# Patient Record
Sex: FEMALE | Race: WHITE | NOT HISPANIC OR LATINO | Employment: FULL TIME | URBAN - METROPOLITAN AREA
[De-identification: names, ages, dates, MRNs, and addresses within clinical notes are randomized per-mention and may not be internally consistent; named-entity substitution may affect disease eponyms.]

---

## 2017-04-03 ENCOUNTER — HOSPITAL ENCOUNTER (EMERGENCY)
Facility: MEDICAL CENTER | Age: 57
End: 2017-04-03
Attending: EMERGENCY MEDICINE
Payer: COMMERCIAL

## 2017-04-03 VITALS
DIASTOLIC BLOOD PRESSURE: 77 MMHG | WEIGHT: 162 LBS | BODY MASS INDEX: 24.55 KG/M2 | RESPIRATION RATE: 18 BRPM | OXYGEN SATURATION: 99 % | TEMPERATURE: 98.6 F | HEART RATE: 79 BPM | SYSTOLIC BLOOD PRESSURE: 138 MMHG | HEIGHT: 68 IN

## 2017-04-03 DIAGNOSIS — S39.012A LOW BACK STRAIN, INITIAL ENCOUNTER: ICD-10-CM

## 2017-04-03 DIAGNOSIS — S16.1XXA NECK MUSCLE STRAIN, INITIAL ENCOUNTER: ICD-10-CM

## 2017-04-03 PROCEDURE — 99284 EMERGENCY DEPT VISIT MOD MDM: CPT

## 2017-04-03 ASSESSMENT — PAIN SCALES - GENERAL
PAINLEVEL_OUTOF10: 4
PAINLEVEL_OUTOF10: 4

## 2017-04-03 ASSESSMENT — LIFESTYLE VARIABLES: DO YOU DRINK ALCOHOL: NO

## 2017-04-03 NOTE — ED AVS SNAPSHOT
Home Care Instructions                                                                                                                Danika Wetzel   MRN: [id number]    Department:  [facility], Emergency Dept   Date of Visit:  4/3/2017            [facility], Emergency Dept    [address] [facility] [address]    Phone:  [phone]      You were seen by     Magda Castano M.D.      Your Diagnosis Was     Neck muscle strain, initial encounter     S16.1XXA       Follow-up Information     1. Follow up with [facility], Emergency Dept.    Specialty:  Emergency Medicine    Why:  If symptoms worsen - weakness, numbness, tingling    Contact information    [address] [facility] [address] [phone]        2. Schedule an appointment as soon as possible for a visit with [facility].    Why:  for blood pressure management    Contact information    [address] [place] [address] [phone]      Medication Information     Review all of your home medications and newly ordered medications with your primary doctor and/or pharmacist as soon as possible. Follow medication instructions as directed by your doctor and/or pharmacist.     Please keep your complete medication list with you and share with your physician. Update the information when medications are discontinued, doses are changed, or new medications (including over-the-counter products) are added; and carry medication information at all times in the event of emergency situations.               Medication List      Notice     You have not been prescribed any medications.              Discharge Instructions       Cervical Sprain  A cervical sprain is an injury in the neck in which the strong, fibrous tissues (ligaments) that connect your neck bones stretch or tear. Cervical sprains can range from mild to severe. Severe cervical sprains can cause the neck vertebrae to be unstable.  This can lead to damage of the spinal cord and can result in serious nervous system problems. The amount of time it takes for a cervical sprain to get better depends on the cause and extent of the injury. Most cervical sprains heal in 1 to 3 weeks.  CAUSES   Severe cervical sprains may be caused by:   · Contact sport injuries (such as from football, rugby, wrestling, hockey, auto racing, gymnastics, diving, martial arts, or boxing).    · Motor vehicle collisions.    · Whiplash injuries. This is an injury from a sudden forward and backward whipping movement of the head and neck.   · Falls.    Mild cervical sprains may be caused by:   · Being in an awkward position, such as while cradling a telephone between your ear and shoulder.    · Sitting in a chair that does not offer proper support.    · Working at a poorly designed computer station.    · Looking up or down for long periods of time.    SYMPTOMS   · Pain, soreness, stiffness, or a burning sensation in the front, back, or sides of the neck. This discomfort may develop immediately after the injury or slowly, 24 hours or more after the injury.    · Pain or tenderness directly in the middle of the back of the neck.    · Shoulder or upper back pain.    · Limited ability to move the neck.    · Headache.    · Dizziness.    · Weakness, numbness, or tingling in the hands or arms.    · Muscle spasms.    · Difficulty swallowing or chewing.    · Tenderness and swelling of the neck.    DIAGNOSIS   Most of the time your health care provider can diagnose a cervical sprain by taking your history and doing a physical exam. Your health care provider will ask about previous neck injuries and any known neck problems, such as arthritis in the neck. X-rays may be taken to find out if there are any other problems, such as with the bones of the neck. Other tests, such as a CT scan or MRI, may also be needed.   TREATMENT   Treatment depends on the severity of the cervical sprain. Mild  sprains can be treated with rest, keeping the neck in place (immobilization), and pain medicines. Severe cervical sprains are immediately immobilized. Further treatment is done to help with pain, muscle spasms, and other symptoms and may include:  · Medicines, such as pain relievers, numbing medicines, or muscle relaxants.    · Physical therapy. This may involve stretching exercises, strengthening exercises, and posture training. Exercises and improved posture can help stabilize the neck, strengthen muscles, and help stop symptoms from returning.    HOME CARE INSTRUCTIONS   · Put ice on the injured area.    ¨ Put ice in a plastic bag.    ¨ Place a towel between your skin and the bag.    ¨ Leave the ice on for 15-20 minutes, 3-4 times a day.    · If your injury was severe, you may have been given a cervical collar to wear. A cervical collar is a two-piece collar designed to keep your neck from moving while it heals.  ¨ Do not remove the collar unless instructed by your health care provider.  ¨ If you have long hair, keep it outside of the collar.  ¨ Ask your health care provider before making any adjustments to your collar. Minor adjustments may be required over time to improve comfort and reduce pressure on your chin or on the back of your head.  ¨ If you are allowed to remove the collar for cleaning or bathing, follow your health care provider's instructions on how to do so safely.  ¨ Keep your collar clean by wiping it with mild soap and water and drying it completely. If the collar you have been given includes removable pads, remove them every 1-2 days and hand wash them with soap and water. Allow them to air dry. They should be completely dry before you wear them in the collar.  ¨ If you are allowed to remove the collar for cleaning and bathing, wash and dry the skin of your neck. Check your skin for irritation or sores. If you see any, tell your health care provider.  ¨ Do not drive while wearing the collar.     · Only take over-the-counter or prescription medicines for pain, discomfort, or fever as directed by your health care provider.    · Keep all follow-up appointments as directed by your health care provider.    · Keep all physical therapy appointments as directed by your health care provider.    · Make any needed adjustments to your workstation to promote good posture.    · Avoid positions and activities that make your symptoms worse.    · Warm up and stretch before being active to help prevent problems.    SEEK MEDICAL CARE IF:   · Your pain is not controlled with medicine.    · You are unable to decrease your pain medicine over time as planned.    · Your activity level is not improving as expected.    SEEK IMMEDIATE MEDICAL CARE IF:   · You develop any bleeding.  · You develop stomach upset.  · You have signs of an allergic reaction to your medicine.    · Your symptoms get worse.    · You develop new, unexplained symptoms.    · You have numbness, tingling, weakness, or paralysis in any part of your body.    MAKE SURE YOU:   · Understand these instructions.  · Will watch your condition.  · Will get help right away if you are not doing well or get worse.     This information is not intended to replace advice given to you by your health care provider. Make sure you discuss any questions you have with your health care provider.     Document Released: 10/14/2008 Document Revised: 12/23/2014 Document Reviewed: 06/25/2014  Elsevier Interactive Patient Education ©2016 [employer]            Patient Information     Patient Information    Following emergency treatment: all patient requiring follow-up care must return either to a private physician or a clinic if your condition worsens before you are able to obtain further medical attention, please return to the emergency room.     Billing Information    At [facility], we work to make the billing process streamlined for our patients.  Our Representatives are here to  answer any questions you may have regarding your hospital bill.  If you have insurance coverage and have supplied your insurance information to us, we will submit a claim to your insurer on your behalf.  Should you have any questions regarding your bill, we can be reached online or by phone as follows:  Online: You are able pay your bills online or live chat with our representatives about any billing questions you may have. We are here to help Monday - Friday from 8:00am to 7:30pm and 9:00am - 12:00pm on Saturdays.  Please visit [email or web address]  for more information.   Phone:  [phone] or [phone]    Please note that your emergency physician, surgeon, pathologist, radiologist, anesthesiologist, and other specialists are not employed by [facility] and will therefore bill separately for their services.  Please contact them directly for any questions concerning their bills at the numbers below:     Emergency Physician Services:  [phone]  [facility]:  [phone]  [facility]:  [phone]  [facility]:  [phone]    1. Your final bill may vary from the amount quoted upon discharge if all procedures are not complete at that time, or if your doctor has additional procedures of which we are not aware. You will receive an additional bill if you return to the Emergency Department at [facility] for suture removal regardless of the facility of which the sutures were placed.     2. Please arrange for settlement of this account at the emergency registration.    3. All self-pay accounts are due in full at the time of treatment.  If you are unable to meet this obligation then payment is expected within 4-5 days.     4. If you have had radiology studies (CT, X-ray, Ultrasound, MRI), you have received a preliminary result during your emergency department visit. Please contact the radiology department [phone] to receive  a copy of your final result. Please discuss the Final result with your primary physician or with the follow up physician provided.     Crisis Hotline:  [place] Crisis Hotline:  7-329-BIWAYQY or 1-386.829.6514  [place] Crisis Hotline:    1-577.912.5499 or 760-479-9215         ED Discharge Follow Up Questions    1. In order to provide you with very good care, we would like to follow up with a phone call in the next few days.  May we have your permission to contact you?     YES /  NO    2. What is the best phone number to call you? (       )_____-__________    3. What is the best time to call you?      Morning  /  Afternoon  /  Evening                   Patient Signature:  ____________________________________________________________    Date:  ____________________________________________________________

## 2017-04-03 NOTE — ED AVS SNAPSHOT
[employer] Access Code: [email or web address]  Expires: 5/3/2017  8:10 PM    Your email address is not on file at [facility].  Email Addresses are required for you to sign up for [employer], please contact [phone] to verify your personal information and to provide your email address prior to attempting to register for [employer].    Danika Wetzel  No address on file    [employer]  A secure, online tool to manage your health information     [facility]’s [employer]® is a secure, online tool that connects you to your personalized health information from the privacy of your home -- day or night - making it very easy for you to manage your healthcare. Once the activation process is completed, you can even access your medical information using the [employer] kindra, which is available for free in the Apple Kindra store or Google Play store.     To learn more about [employer], visit [email or web address]    There are two levels of access available (as shown below):   My Chart Features  [facility] Primary Care Doctor [facility]  Specialists [facility]  Urgent  Care Non-[facility] Primary Care Doctor   Email your healthcare team securely and privately 24/7 X X X    Manage appointments: schedule your next appointment; view details of past/upcoming appointments X      Request prescription refills. X      View recent personal medical records, including lab and immunizations X X X X   View health record, including health history, allergies, medications X X X X   Read reports about your outpatient visits, procedures, consult and ER notes X X X X   See your discharge summary, which is a recap of your hospital and/or ER visit that includes your diagnosis, lab results, and care plan X X  X     How to register for [employer]:  Once your e-mail address has been verified, follow the following steps to sign up for [employer].     1. Go to  [email or web address]  2. Click on the Sign Up Now box, which takes you to the New Member Sign Up page. You will need to provide the  following information:  a. Enter your [employer] Access Code exactly as it appears at the top of this page. (You will not need to use this code after you’ve completed the sign-up process. If you do not sign up before the expiration date, you must request a new code.)   b. Enter your date of birth.   c. Enter your home email address.   d. Click Submit, and follow the next screen’s instructions.  3. Create a [employer] ID. This will be your [employer] login ID and cannot be changed, so think of one that is secure and easy to remember.  4. Create a [employer] password. You can change your password at any time.  5. Enter your Password Reset Question and Answer. This can be used at a later time if you forget your password.   6. Enter your e-mail address. This allows you to receive e-mail notifications when new information is available in [employer].  7. Click Sign Up. You can now view your health information.    For assistance activating your [employer] account, call [phone]

## 2017-04-03 NOTE — ED AVS SNAPSHOT
4/3/2017          Danika Wetzel  No address on file.    Dear Danika:    [facility] wants to ensure your discharge home is safe and you or your loved ones have had all your questions answered regarding your care after you leave the hospital.    You may receive a telephone call within two days of your discharge.  This call is to make certain you understand your discharge instructions as well as ensure we provided you with the best care possible during your stay with us.     The call will only last approximately 3-5 minutes and will be done by a nurse.    Once again, we want to ensure your discharge home is safe and that you have a clear understanding of any next steps in your care.  If you have any questions or concerns, please do not hesitate to contact us, we are here for you.  Thank you for choosing [facility] for your healthcare needs.    Sincerely,    Claude Correa    [facility]
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
## 2017-04-04 NOTE — DISCHARGE INSTRUCTIONS
Cervical Sprain  A cervical sprain is an injury in the neck in which the strong, fibrous tissues (ligaments) that connect your neck bones stretch or tear. Cervical sprains can range from mild to severe. Severe cervical sprains can cause the neck vertebrae to be unstable. This can lead to damage of the spinal cord and can result in serious nervous system problems. The amount of time it takes for a cervical sprain to get better depends on the cause and extent of the injury. Most cervical sprains heal in 1 to 3 weeks.  CAUSES   Severe cervical sprains may be caused by:   · Contact sport injuries (such as from football, rugby, wrestling, hockey, auto racing, gymnastics, diving, martial arts, or boxing).    · Motor vehicle collisions.    · Whiplash injuries. This is an injury from a sudden forward and backward whipping movement of the head and neck.   · Falls.    Mild cervical sprains may be caused by:   · Being in an awkward position, such as while cradling a telephone between your ear and shoulder.    · Sitting in a chair that does not offer proper support.    · Working at a poorly designed computer station.    · Looking up or down for long periods of time.    SYMPTOMS   · Pain, soreness, stiffness, or a burning sensation in the front, back, or sides of the neck. This discomfort may develop immediately after the injury or slowly, 24 hours or more after the injury.    · Pain or tenderness directly in the middle of the back of the neck.    · Shoulder or upper back pain.    · Limited ability to move the neck.    · Headache.    · Dizziness.    · Weakness, numbness, or tingling in the hands or arms.    · Muscle spasms.    · Difficulty swallowing or chewing.    · Tenderness and swelling of the neck.    DIAGNOSIS   Most of the time your health care provider can diagnose a cervical sprain by taking your history and doing a physical exam. Your health care provider will ask about previous neck injuries and any known neck  problems, such as arthritis in the neck. X-rays may be taken to find out if there are any other problems, such as with the bones of the neck. Other tests, such as a CT scan or MRI, may also be needed.   TREATMENT   Treatment depends on the severity of the cervical sprain. Mild sprains can be treated with rest, keeping the neck in place (immobilization), and pain medicines. Severe cervical sprains are immediately immobilized. Further treatment is done to help with pain, muscle spasms, and other symptoms and may include:  · Medicines, such as pain relievers, numbing medicines, or muscle relaxants.    · Physical therapy. This may involve stretching exercises, strengthening exercises, and posture training. Exercises and improved posture can help stabilize the neck, strengthen muscles, and help stop symptoms from returning.    HOME CARE INSTRUCTIONS   · Put ice on the injured area.    ¨ Put ice in a plastic bag.    ¨ Place a towel between your skin and the bag.    ¨ Leave the ice on for 15-20 minutes, 3-4 times a day.    · If your injury was severe, you may have been given a cervical collar to wear. A cervical collar is a two-piece collar designed to keep your neck from moving while it heals.  ¨ Do not remove the collar unless instructed by your health care provider.  ¨ If you have long hair, keep it outside of the collar.  ¨ Ask your health care provider before making any adjustments to your collar. Minor adjustments may be required over time to improve comfort and reduce pressure on your chin or on the back of your head.  ¨ If you are allowed to remove the collar for cleaning or bathing, follow your health care provider's instructions on how to do so safely.  ¨ Keep your collar clean by wiping it with mild soap and water and drying it completely. If the collar you have been given includes removable pads, remove them every 1-2 days and hand wash them with soap and water. Allow them to air dry. They should be completely  dry before you wear them in the collar.  ¨ If you are allowed to remove the collar for cleaning and bathing, wash and dry the skin of your neck. Check your skin for irritation or sores. If you see any, tell your health care provider.  ¨ Do not drive while wearing the collar.    · Only take over-the-counter or prescription medicines for pain, discomfort, or fever as directed by your health care provider.    · Keep all follow-up appointments as directed by your health care provider.    · Keep all physical therapy appointments as directed by your health care provider.    · Make any needed adjustments to your workstation to promote good posture.    · Avoid positions and activities that make your symptoms worse.    · Warm up and stretch before being active to help prevent problems.    SEEK MEDICAL CARE IF:   · Your pain is not controlled with medicine.    · You are unable to decrease your pain medicine over time as planned.    · Your activity level is not improving as expected.    SEEK IMMEDIATE MEDICAL CARE IF:   · You develop any bleeding.  · You develop stomach upset.  · You have signs of an allergic reaction to your medicine.    · Your symptoms get worse.    · You develop new, unexplained symptoms.    · You have numbness, tingling, weakness, or paralysis in any part of your body.    MAKE SURE YOU:   · Understand these instructions.  · Will watch your condition.  · Will get help right away if you are not doing well or get worse.     This information is not intended to replace advice given to you by your health care provider. Make sure you discuss any questions you have with your health care provider.     Document Released: 10/14/2008 Document Revised: 12/23/2014 Document Reviewed: 06/25/2014  [employer] Interactive Patient Education ©2016 [employer]
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
## 2017-05-26 ENCOUNTER — HOSPITAL ENCOUNTER (OUTPATIENT)
Dept: LAB | Facility: MEDICAL CENTER | Age: 57
End: 2017-05-26
Attending: GENERAL PRACTICE
Payer: COMMERCIAL

## 2017-05-26 LAB
25(OH)D3 SERPL-MCNC: 27 NG/ML (ref 30–100)
ALBUMIN SERPL BCP-MCNC: 4.2 G/DL (ref 3.2–4.9)
ALBUMIN/GLOB SERPL: 1.6 G/DL
ALP SERPL-CCNC: 50 U/L (ref 30–99)
ALT SERPL-CCNC: 23 U/L (ref 2–50)
ANION GAP SERPL CALC-SCNC: 7 MMOL/L (ref 0–11.9)
APPEARANCE UR: CLEAR
AST SERPL-CCNC: 19 U/L (ref 12–45)
BILIRUB SERPL-MCNC: 0.7 MG/DL (ref 0.1–1.5)
BILIRUB UR QL STRIP.AUTO: NEGATIVE
BUN SERPL-MCNC: 15 MG/DL (ref 8–22)
CALCIUM SERPL-MCNC: 9.2 MG/DL (ref 8.5–10.5)
CHLORIDE SERPL-SCNC: 105 MMOL/L (ref 96–112)
CHOLEST SERPL-MCNC: 201 MG/DL (ref 100–199)
CO2 SERPL-SCNC: 26 MMOL/L (ref 20–33)
COLOR UR: COLORLESS
CORTIS SERPL-MCNC: 8.1 UG/DL (ref 0–23)
CREAT SERPL-MCNC: 0.75 MG/DL (ref 0.5–1.4)
CRP SERPL HS-MCNC: 3 MG/L (ref 0–7.5)
EPI CELLS #/AREA URNS HPF: NORMAL /HPF
ESTRADIOL SERPL-MCNC: <20 PG/ML
FOLATE SERPL-MCNC: >24 NG/ML
GFR SERPL CREATININE-BSD FRML MDRD: >60 ML/MIN/1.73 M 2
GLOBULIN SER CALC-MCNC: 2.6 G/DL (ref 1.9–3.5)
GLUCOSE SERPL-MCNC: 77 MG/DL (ref 65–99)
GLUCOSE UR STRIP.AUTO-MCNC: NEGATIVE MG/DL
HCYS SERPL-SCNC: 8.86 UMOL/L
IRON SATN MFR SERPL: 43 % (ref 15–55)
IRON SERPL-MCNC: 137 UG/DL (ref 40–170)
KETONES UR STRIP.AUTO-MCNC: NEGATIVE MG/DL
LEUKOCYTE ESTERASE UR QL STRIP.AUTO: ABNORMAL
MICRO URNS: ABNORMAL
NITRITE UR QL STRIP.AUTO: NEGATIVE
PH UR STRIP.AUTO: 6.5 [PH]
POTASSIUM SERPL-SCNC: 4 MMOL/L (ref 3.6–5.5)
PROT SERPL-MCNC: 6.8 G/DL (ref 6–8.2)
PROT UR QL STRIP: NEGATIVE MG/DL
RBC # URNS HPF: NORMAL /HPF
RBC UR QL AUTO: NEGATIVE
SODIUM SERPL-SCNC: 138 MMOL/L (ref 135–145)
SP GR UR STRIP.AUTO: 1
T3 SERPL-MCNC: 100.9 NG/DL (ref 60–181)
T3FREE SERPL-MCNC: 3.18 PG/ML (ref 2.4–4.2)
T4 SERPL-MCNC: 6.5 UG/DL (ref 4–12)
TIBC SERPL-MCNC: 315 UG/DL (ref 250–450)
TSH SERPL DL<=0.005 MIU/L-ACNC: 0.66 UIU/ML (ref 0.3–3.7)
VIT B12 SERPL-MCNC: 588 PG/ML (ref 211–911)
WBC #/AREA URNS HPF: NORMAL /HPF

## 2017-05-26 PROCEDURE — 84144 ASSAY OF PROGESTERONE: CPT

## 2017-05-26 PROCEDURE — 80061 LIPID PANEL: CPT

## 2017-05-26 PROCEDURE — 84403 ASSAY OF TOTAL TESTOSTERONE: CPT

## 2017-05-26 PROCEDURE — 84436 ASSAY OF TOTAL THYROXINE: CPT

## 2017-05-26 PROCEDURE — 82746 ASSAY OF FOLIC ACID SERUM: CPT

## 2017-05-26 PROCEDURE — 81001 URINALYSIS AUTO W/SCOPE: CPT

## 2017-05-26 PROCEDURE — 83540 ASSAY OF IRON: CPT

## 2017-05-26 PROCEDURE — 82542 COL CHROMOTOGRAPHY QUAL/QUAN: CPT

## 2017-05-26 PROCEDURE — 83036 HEMOGLOBIN GLYCOSYLATED A1C: CPT

## 2017-05-26 PROCEDURE — 82465 ASSAY BLD/SERUM CHOLESTEROL: CPT

## 2017-05-26 PROCEDURE — 84480 ASSAY TRIIODOTHYRONINE (T3): CPT

## 2017-05-26 PROCEDURE — 86141 C-REACTIVE PROTEIN HS: CPT

## 2017-05-26 PROCEDURE — 83090 ASSAY OF HOMOCYSTEINE: CPT

## 2017-05-26 PROCEDURE — 85652 RBC SED RATE AUTOMATED: CPT

## 2017-05-26 PROCEDURE — 82533 TOTAL CORTISOL: CPT

## 2017-05-26 PROCEDURE — 82670 ASSAY OF TOTAL ESTRADIOL: CPT

## 2017-05-26 PROCEDURE — 82607 VITAMIN B-12: CPT

## 2017-05-26 PROCEDURE — 84443 ASSAY THYROID STIM HORMONE: CPT

## 2017-05-26 PROCEDURE — 83704 LIPOPROTEIN BLD QUAN PART: CPT

## 2017-05-26 PROCEDURE — 83525 ASSAY OF INSULIN: CPT

## 2017-05-26 PROCEDURE — 36415 COLL VENOUS BLD VENIPUNCTURE: CPT

## 2017-05-26 PROCEDURE — 84481 FREE ASSAY (FT-3): CPT

## 2017-05-26 PROCEDURE — 83550 IRON BINDING TEST: CPT

## 2017-05-26 PROCEDURE — 82306 VITAMIN D 25 HYDROXY: CPT

## 2017-05-26 PROCEDURE — 84270 ASSAY OF SEX HORMONE GLOBUL: CPT

## 2017-05-26 PROCEDURE — 85025 COMPLETE CBC W/AUTO DIFF WBC: CPT

## 2017-05-26 PROCEDURE — 80053 COMPREHEN METABOLIC PANEL: CPT

## 2017-05-27 LAB
BASOPHILS # BLD AUTO: 0.8 % (ref 0–1.8)
BASOPHILS # BLD: 0.03 K/UL (ref 0–0.12)
EOSINOPHIL # BLD AUTO: 0.22 K/UL (ref 0–0.51)
EOSINOPHIL NFR BLD: 6.1 % (ref 0–6.9)
ERYTHROCYTE [DISTWIDTH] IN BLOOD BY AUTOMATED COUNT: 45.4 FL (ref 35.9–50)
ERYTHROCYTE [SEDIMENTATION RATE] IN BLOOD BY WESTERGREN METHOD: 12 MM/HOUR (ref 0–30)
EST. AVERAGE GLUCOSE BLD GHB EST-MCNC: 103 MG/DL
HBA1C MFR BLD: 5.2 % (ref 0–5.6)
HCT VFR BLD AUTO: 44.3 % (ref 37–47)
HGB BLD-MCNC: 14.4 G/DL (ref 12–16)
IMM GRANULOCYTES # BLD AUTO: 0.02 K/UL (ref 0–0.11)
IMM GRANULOCYTES NFR BLD AUTO: 0.6 % (ref 0–0.9)
LYMPHOCYTES # BLD AUTO: 1.12 K/UL (ref 1–4.8)
LYMPHOCYTES NFR BLD: 30.9 % (ref 22–41)
MCH RBC QN AUTO: 31.2 PG (ref 27–33)
MCHC RBC AUTO-ENTMCNC: 32.5 G/DL (ref 33.6–35)
MCV RBC AUTO: 96.1 FL (ref 81.4–97.8)
MONOCYTES # BLD AUTO: 0.25 K/UL (ref 0–0.85)
MONOCYTES NFR BLD AUTO: 6.9 % (ref 0–13.4)
NEUTROPHILS # BLD AUTO: 1.98 K/UL (ref 2–7.15)
NEUTROPHILS NFR BLD: 54.7 % (ref 44–72)
NRBC # BLD AUTO: 0 K/UL
NRBC BLD AUTO-RTO: 0 /100 WBC
PLATELET # BLD AUTO: 171 K/UL (ref 164–446)
PMV BLD AUTO: 10 FL (ref 9–12.9)
PROGEST SERPL-MCNC: <0.08 NG/ML
RBC # BLD AUTO: 4.61 M/UL (ref 4.2–5.4)
WBC # BLD AUTO: 3.6 K/UL (ref 4.8–10.8)

## 2017-05-28 LAB — INSULIN P FAST SERPL-ACNC: 5 UIU/ML (ref 3–19)

## 2017-05-29 LAB
ANDROSTANOLONE SERPL-MCNC: 55.1 PG/ML (ref 24–208)
SHBG SERPL-SCNC: 102 NMOL/L (ref 30–135)
TESTOST FREE SERPL-MCNC: 1.9 PG/ML (ref 0.6–3.8)
TESTOST SERPL-MCNC: 24 NG/DL (ref 9–55)

## 2017-05-31 ENCOUNTER — HOSPITAL ENCOUNTER (OUTPATIENT)
Facility: MEDICAL CENTER | Age: 57
End: 2017-05-31
Attending: GENERAL PRACTICE
Payer: COMMERCIAL

## 2017-05-31 PROCEDURE — 81001 URINALYSIS AUTO W/SCOPE: CPT

## 2017-05-31 PROCEDURE — 87086 URINE CULTURE/COLONY COUNT: CPT

## 2017-06-01 LAB
APPEARANCE UR: CLEAR
BILIRUB UR QL STRIP.AUTO: NEGATIVE
CHOLEST SERPL-MCNC: 212 MG/DL (ref 100–199)
COLOR UR: YELLOW
GLUCOSE UR STRIP.AUTO-MCNC: NEGATIVE MG/DL
HDL PARTICAL NO Q4363: 45.8 UMOL/L
HDL SERPL QN: 10 NM
HDLC SERPL-MCNC: 100 MG/DL
HLD.LARGE SERPL-SCNC: 14.5 UMOL/L
KETONES UR STRIP.AUTO-MCNC: NEGATIVE MG/DL
LDL MED SERPL QN: 21.6 NM
LDL SERPL QN: 21.6 NM
LDL SERPL-SCNC: 932 NMOL/L
LDL SMALL SERPL-SCNC: <90 NMOL/L
LDL SMALL SERPL-SCNC: <90 NMOL/L
LDLC SERPL CALC-MCNC: 98 MG/DL (ref 0–99)
LEUKOCYTE ESTERASE UR QL STRIP.AUTO: ABNORMAL
LP IR SCORE Q4364: <25
MICRO URNS: ABNORMAL
NITRITE UR QL STRIP.AUTO: NEGATIVE
PH UR STRIP.AUTO: 6 [PH]
PROT UR QL STRIP: NEGATIVE MG/DL
RBC # URNS HPF: ABNORMAL /HPF
RBC UR QL AUTO: NEGATIVE
SP GR UR STRIP.AUTO: 1.02
TRIGL SERPL-MCNC: 72 MG/DL (ref 0–149)
VLDL LARGE SERPL-SCNC: <0.8 NMOL/L
VLDL SERPL QN: 45.2 NM
WBC #/AREA URNS HPF: ABNORMAL /HPF

## 2017-06-03 LAB
BACTERIA UR CULT: NORMAL
SIGNIFICANT IND 70042: NORMAL
SOURCE SOURCE: NORMAL

## 2017-08-21 ENCOUNTER — HOSPITAL ENCOUNTER (OUTPATIENT)
Facility: MEDICAL CENTER | Age: 57
End: 2017-08-21
Attending: GENERAL PRACTICE
Payer: COMMERCIAL

## 2017-08-21 PROCEDURE — 81003 URINALYSIS AUTO W/O SCOPE: CPT

## 2017-08-22 LAB
APPEARANCE UR: CLEAR
COLOR UR: YELLOW
CULTURE IF INDICATED INDCX: NO UA CULTURE
GLUCOSE UR STRIP.AUTO-MCNC: NEGATIVE MG/DL
KETONES UR STRIP.AUTO-MCNC: NEGATIVE MG/DL
LEUKOCYTE ESTERASE UR QL STRIP.AUTO: NEGATIVE
MICRO URNS: NORMAL
NITRITE UR QL STRIP.AUTO: NEGATIVE
PH UR STRIP.AUTO: 7.5 [PH]
PROT UR QL STRIP: NEGATIVE MG/DL
RBC UR QL AUTO: NEGATIVE
SP GR UR STRIP.AUTO: 1.01

## 2017-11-27 ENCOUNTER — HOSPITAL ENCOUNTER (OUTPATIENT)
Dept: RADIOLOGY | Facility: MEDICAL CENTER | Age: 57
End: 2017-11-27
Payer: COMMERCIAL

## 2017-11-27 DIAGNOSIS — K30 MILD DIETARY INDIGESTION: ICD-10-CM

## 2017-11-27 DIAGNOSIS — R10.84 ABDOMINAL PAIN, GENERALIZED: ICD-10-CM

## 2017-11-27 PROCEDURE — 74247 DX-UPPER GI-AIR CONTRAST: CPT

## 2017-11-27 PROCEDURE — 700112 HCHG RX REV CODE 229

## 2017-11-27 PROCEDURE — 76705 ECHO EXAM OF ABDOMEN: CPT

## 2017-11-27 PROCEDURE — A9270 NON-COVERED ITEM OR SERVICE: HCPCS

## 2017-11-27 RX ADMIN — ANTACID/ANTIFLATULENT 1 PACKET: 380; 550; 10; 10 GRANULE, EFFERVESCENT ORAL at 08:30

## 2019-03-14 ENCOUNTER — HOSPITAL ENCOUNTER (OUTPATIENT)
Dept: LAB | Facility: MEDICAL CENTER | Age: 59
End: 2019-03-14
Attending: NURSE PRACTITIONER
Payer: COMMERCIAL

## 2019-03-14 LAB
25(OH)D3 SERPL-MCNC: 23 NG/ML (ref 30–100)
ALBUMIN SERPL BCP-MCNC: 4.5 G/DL (ref 3.2–4.9)
ALBUMIN/GLOB SERPL: 1.6 G/DL
ALP SERPL-CCNC: 51 U/L (ref 30–99)
ALT SERPL-CCNC: 21 U/L (ref 2–50)
ANION GAP SERPL CALC-SCNC: 9 MMOL/L (ref 0–11.9)
AST SERPL-CCNC: 18 U/L (ref 12–45)
BILIRUB SERPL-MCNC: 0.6 MG/DL (ref 0.1–1.5)
BUN SERPL-MCNC: 17 MG/DL (ref 8–22)
CALCIUM SERPL-MCNC: 8.8 MG/DL (ref 8.5–10.5)
CHLORIDE SERPL-SCNC: 107 MMOL/L (ref 96–112)
CHOLEST SERPL-MCNC: 200 MG/DL (ref 100–199)
CO2 SERPL-SCNC: 26 MMOL/L (ref 20–33)
CREAT SERPL-MCNC: 0.81 MG/DL (ref 0.5–1.4)
CRP SERPL HS-MCNC: 6.8 MG/L (ref 0–7.5)
ERYTHROCYTE [DISTWIDTH] IN BLOOD BY AUTOMATED COUNT: 46 FL (ref 35.9–50)
EST. AVERAGE GLUCOSE BLD GHB EST-MCNC: 111 MG/DL
FASTING STATUS PATIENT QL REPORTED: NORMAL
GLOBULIN SER CALC-MCNC: 2.8 G/DL (ref 1.9–3.5)
GLUCOSE SERPL-MCNC: 96 MG/DL (ref 65–99)
HBA1C MFR BLD: 5.5 % (ref 0–5.6)
HCT VFR BLD AUTO: 46.1 % (ref 37–47)
HDLC SERPL-MCNC: 80 MG/DL
HGB BLD-MCNC: 15 G/DL (ref 12–16)
LDLC SERPL CALC-MCNC: 103 MG/DL
MCH RBC QN AUTO: 31.8 PG (ref 27–33)
MCHC RBC AUTO-ENTMCNC: 32.5 G/DL (ref 33.6–35)
MCV RBC AUTO: 97.7 FL (ref 81.4–97.8)
PLATELET # BLD AUTO: 213 K/UL (ref 164–446)
PMV BLD AUTO: 10.1 FL (ref 9–12.9)
POTASSIUM SERPL-SCNC: 4.5 MMOL/L (ref 3.6–5.5)
PROT SERPL-MCNC: 7.3 G/DL (ref 6–8.2)
RBC # BLD AUTO: 4.72 M/UL (ref 4.2–5.4)
SODIUM SERPL-SCNC: 142 MMOL/L (ref 135–145)
T4 FREE SERPL-MCNC: 0.81 NG/DL (ref 0.53–1.43)
TRIGL SERPL-MCNC: 84 MG/DL (ref 0–149)
TSH SERPL DL<=0.005 MIU/L-ACNC: 1 UIU/ML (ref 0.38–5.33)
VIT B12 SERPL-MCNC: 418 PG/ML (ref 211–911)
WBC # BLD AUTO: 4.4 K/UL (ref 4.8–10.8)

## 2019-03-14 PROCEDURE — 36415 COLL VENOUS BLD VENIPUNCTURE: CPT

## 2019-03-14 PROCEDURE — 80053 COMPREHEN METABOLIC PANEL: CPT

## 2019-03-14 PROCEDURE — 85027 COMPLETE CBC AUTOMATED: CPT

## 2019-03-14 PROCEDURE — 82607 VITAMIN B-12: CPT

## 2019-03-14 PROCEDURE — 82785 ASSAY OF IGE: CPT

## 2019-03-14 PROCEDURE — 82306 VITAMIN D 25 HYDROXY: CPT

## 2019-03-14 PROCEDURE — 86141 C-REACTIVE PROTEIN HS: CPT

## 2019-03-14 PROCEDURE — 83036 HEMOGLOBIN GLYCOSYLATED A1C: CPT

## 2019-03-14 PROCEDURE — 84443 ASSAY THYROID STIM HORMONE: CPT

## 2019-03-14 PROCEDURE — 80061 LIPID PANEL: CPT

## 2019-03-14 PROCEDURE — 84439 ASSAY OF FREE THYROXINE: CPT

## 2019-03-16 LAB — IGE SERPL-ACNC: 13 KU/L

## 2019-06-23 ENCOUNTER — HOSPITAL ENCOUNTER (OUTPATIENT)
Dept: RADIOLOGY | Facility: MEDICAL CENTER | Age: 59
End: 2019-06-23
Attending: NURSE PRACTITIONER
Payer: COMMERCIAL

## 2019-06-23 DIAGNOSIS — R10.12 ABDOMINAL PAIN, LEFT UPPER QUADRANT: ICD-10-CM

## 2019-06-23 DIAGNOSIS — R10.11 ABDOMINAL PAIN, RIGHT UPPER QUADRANT: ICD-10-CM

## 2019-06-23 PROCEDURE — 76700 US EXAM ABDOM COMPLETE: CPT

## 2019-06-26 ENCOUNTER — HOSPITAL ENCOUNTER (OUTPATIENT)
Dept: RADIOLOGY | Facility: MEDICAL CENTER | Age: 59
End: 2019-06-26
Attending: NURSE PRACTITIONER
Payer: COMMERCIAL

## 2019-06-26 DIAGNOSIS — R10.11 ABDOMINAL PAIN, RIGHT UPPER QUADRANT: ICD-10-CM

## 2019-06-26 DIAGNOSIS — R10.12 ABDOMINAL PAIN, LEFT UPPER QUADRANT: ICD-10-CM

## 2019-06-26 PROCEDURE — A9537 TC99M MEBROFENIN: HCPCS

## 2021-01-06 ENCOUNTER — HOSPITAL ENCOUNTER (OUTPATIENT)
Dept: LAB | Facility: MEDICAL CENTER | Age: 61
End: 2021-01-06
Attending: NURSE PRACTITIONER
Payer: COMMERCIAL

## 2021-01-06 LAB
25(OH)D3 SERPL-MCNC: 22 NG/ML (ref 30–100)
ALBUMIN SERPL BCP-MCNC: 4.5 G/DL (ref 3.2–4.9)
ALBUMIN/GLOB SERPL: 1.8 G/DL
ALP SERPL-CCNC: 61 U/L (ref 30–99)
ALT SERPL-CCNC: 21 U/L (ref 2–50)
ANION GAP SERPL CALC-SCNC: 10 MMOL/L (ref 7–16)
AST SERPL-CCNC: 18 U/L (ref 12–45)
BILIRUB SERPL-MCNC: 0.5 MG/DL (ref 0.1–1.5)
BUN SERPL-MCNC: 16 MG/DL (ref 8–22)
CALCIUM SERPL-MCNC: 9 MG/DL (ref 8.5–10.5)
CHLORIDE SERPL-SCNC: 105 MMOL/L (ref 96–112)
CHOLEST SERPL-MCNC: 199 MG/DL (ref 100–199)
CO2 SERPL-SCNC: 23 MMOL/L (ref 20–33)
CREAT SERPL-MCNC: 0.81 MG/DL (ref 0.5–1.4)
CRP SERPL HS-MCNC: 5 MG/L (ref 0–7.5)
ERYTHROCYTE [DISTWIDTH] IN BLOOD BY AUTOMATED COUNT: 45.5 FL (ref 35.9–50)
EST. AVERAGE GLUCOSE BLD GHB EST-MCNC: 108 MG/DL
FASTING STATUS PATIENT QL REPORTED: NORMAL
GLOBULIN SER CALC-MCNC: 2.5 G/DL (ref 1.9–3.5)
GLUCOSE SERPL-MCNC: 88 MG/DL (ref 65–99)
H PYLORI AG STL QL IA: NOT DETECTED
HBA1C MFR BLD: 5.4 % (ref 0–5.6)
HCT VFR BLD AUTO: 47.4 % (ref 37–47)
HDLC SERPL-MCNC: 74 MG/DL
HGB BLD-MCNC: 15.2 G/DL (ref 12–16)
LDLC SERPL CALC-MCNC: 106 MG/DL
MCH RBC QN AUTO: 31 PG (ref 27–33)
MCHC RBC AUTO-ENTMCNC: 32.1 G/DL (ref 33.6–35)
MCV RBC AUTO: 96.5 FL (ref 81.4–97.8)
PLATELET # BLD AUTO: 245 K/UL (ref 164–446)
PMV BLD AUTO: 9.9 FL (ref 9–12.9)
POTASSIUM SERPL-SCNC: 4.4 MMOL/L (ref 3.6–5.5)
PROT SERPL-MCNC: 7 G/DL (ref 6–8.2)
RBC # BLD AUTO: 4.91 M/UL (ref 4.2–5.4)
SODIUM SERPL-SCNC: 138 MMOL/L (ref 135–145)
T4 FREE SERPL-MCNC: 1.14 NG/DL (ref 0.93–1.7)
TRIGL SERPL-MCNC: 93 MG/DL (ref 0–149)
TSH SERPL DL<=0.005 MIU/L-ACNC: 0.78 UIU/ML (ref 0.38–5.33)
WBC # BLD AUTO: 4.2 K/UL (ref 4.8–10.8)

## 2021-01-06 PROCEDURE — 84439 ASSAY OF FREE THYROXINE: CPT

## 2021-01-06 PROCEDURE — 83036 HEMOGLOBIN GLYCOSYLATED A1C: CPT

## 2021-01-06 PROCEDURE — 86141 C-REACTIVE PROTEIN HS: CPT

## 2021-01-06 PROCEDURE — 85027 COMPLETE CBC AUTOMATED: CPT

## 2021-01-06 PROCEDURE — 80053 COMPREHEN METABOLIC PANEL: CPT

## 2021-01-06 PROCEDURE — 82306 VITAMIN D 25 HYDROXY: CPT

## 2021-01-06 PROCEDURE — 84443 ASSAY THYROID STIM HORMONE: CPT

## 2021-01-06 PROCEDURE — 80061 LIPID PANEL: CPT

## 2021-01-06 PROCEDURE — 87338 HPYLORI STOOL AG IA: CPT

## 2021-01-06 PROCEDURE — 36415 COLL VENOUS BLD VENIPUNCTURE: CPT

## 2021-10-28 ENCOUNTER — HOSPITAL ENCOUNTER (OUTPATIENT)
Dept: LAB | Facility: MEDICAL CENTER | Age: 61
End: 2021-10-28
Attending: CHIROPRACTOR
Payer: COMMERCIAL

## 2021-10-28 LAB
CORTIS SERPL-MCNC: 12.9 UG/DL (ref 0–23)
FSH SERPL-ACNC: 71.7 MIU/ML
HCT VFR BLD AUTO: 46.2 % (ref 37–47)
IRON SATN MFR SERPL: 38 % (ref 15–55)
IRON SERPL-MCNC: 105 UG/DL (ref 40–170)
T3 SERPL-MCNC: 85.1 NG/DL (ref 60–181)
T4 SERPL-MCNC: 7.2 UG/DL (ref 4–12)
TIBC SERPL-MCNC: 275 UG/DL (ref 250–450)
TSH SERPL DL<=0.005 MIU/L-ACNC: 0.52 UIU/ML (ref 0.38–5.33)
UIBC SERPL-MCNC: 170 UG/DL (ref 110–370)
VIT B12 SERPL-MCNC: 513 PG/ML (ref 211–911)

## 2021-10-28 PROCEDURE — 83540 ASSAY OF IRON: CPT

## 2021-10-28 PROCEDURE — 82679 ASSAY OF ESTRONE: CPT

## 2021-10-28 PROCEDURE — 82533 TOTAL CORTISOL: CPT

## 2021-10-28 PROCEDURE — 85652 RBC SED RATE AUTOMATED: CPT

## 2021-10-28 PROCEDURE — 84255 ASSAY OF SELENIUM: CPT

## 2021-10-28 PROCEDURE — 36415 COLL VENOUS BLD VENIPUNCTURE: CPT

## 2021-10-28 PROCEDURE — 84425 ASSAY OF VITAMIN B-1: CPT

## 2021-10-28 PROCEDURE — 83550 IRON BINDING TEST: CPT

## 2021-10-28 PROCEDURE — 82607 VITAMIN B-12: CPT

## 2021-10-28 PROCEDURE — 83001 ASSAY OF GONADOTROPIN (FSH): CPT

## 2021-10-28 PROCEDURE — 82747 ASSAY OF FOLIC ACID RBC: CPT

## 2021-10-28 PROCEDURE — 85014 HEMATOCRIT: CPT

## 2021-10-28 PROCEDURE — 84252 ASSAY OF VITAMIN B-2: CPT

## 2021-10-28 PROCEDURE — 84443 ASSAY THYROID STIM HORMONE: CPT

## 2021-10-28 PROCEDURE — 82978 ASSAY OF GLUTATHIONE: CPT

## 2021-10-28 PROCEDURE — 84436 ASSAY OF TOTAL THYROXINE: CPT

## 2021-10-28 PROCEDURE — 84591 ASSAY OF NOS VITAMIN: CPT | Mod: 91

## 2021-10-28 PROCEDURE — 84480 ASSAY TRIIODOTHYRONINE (T3): CPT

## 2021-10-28 PROCEDURE — 84207 ASSAY OF VITAMIN B-6: CPT

## 2021-10-28 PROCEDURE — 83036 HEMOGLOBIN GLYCOSYLATED A1C: CPT

## 2021-10-29 LAB
ERYTHROCYTE [SEDIMENTATION RATE] IN BLOOD BY WESTERGREN METHOD: 8 MM/HOUR (ref 0–25)
EST. AVERAGE GLUCOSE BLD GHB EST-MCNC: 97 MG/DL
HBA1C MFR BLD: 5 % (ref 4–5.6)

## 2021-10-30 LAB — FOLATE RBC-MCNC: 460 NG/ML

## 2021-11-01 LAB
ESTRONE SERPL-MCNC: 18.3 PG/ML
VIT B2 SERPL-SCNC: 5 NMOL/L (ref 5–50)

## 2021-11-02 LAB
SELENIUM SERPL-MCNC: 141.5 UG/L (ref 23–190)
VIT B1 BLD-MCNC: 129 NMOL/L (ref 70–180)

## 2021-11-03 LAB
TEST NAME 95000: NORMAL
VIT B6 SERPL-MCNC: 453.4 NMOL/L (ref 20–125)

## 2021-11-05 LAB — MISCELLANEOUS LAB RESULT MISCLAB: NORMAL

## 2021-11-29 LAB — CANCELLED TEST NOTIFICATION CANTE: NORMAL

## 2021-12-06 ENCOUNTER — HOSPITAL ENCOUNTER (OUTPATIENT)
Facility: MEDICAL CENTER | Age: 61
End: 2021-12-06
Attending: CHIROPRACTOR
Payer: COMMERCIAL

## 2021-12-06 PROCEDURE — 84591 ASSAY OF NOS VITAMIN: CPT

## 2021-12-29 LAB — MISCELLANEOUS LAB RESULT MISCLAB: ABNORMAL

## 2023-04-24 ENCOUNTER — HOSPITAL ENCOUNTER (OUTPATIENT)
Dept: LAB | Facility: MEDICAL CENTER | Age: 63
End: 2023-04-24
Attending: CHIROPRACTOR
Payer: COMMERCIAL

## 2023-04-24 LAB
BASOPHILS # BLD AUTO: 0.8 % (ref 0–1.8)
BASOPHILS # BLD: 0.03 K/UL (ref 0–0.12)
EOSINOPHIL # BLD AUTO: 0.17 K/UL (ref 0–0.51)
EOSINOPHIL NFR BLD: 4.8 % (ref 0–6.9)
ERYTHROCYTE [DISTWIDTH] IN BLOOD BY AUTOMATED COUNT: 48 FL (ref 35.9–50)
ERYTHROCYTE [SEDIMENTATION RATE] IN BLOOD BY WESTERGREN METHOD: 10 MM/HOUR (ref 0–25)
EST. AVERAGE GLUCOSE BLD GHB EST-MCNC: 111 MG/DL
HBA1C MFR BLD: 5.5 % (ref 4–5.6)
HCT VFR BLD AUTO: 45.8 % (ref 37–47)
HGB BLD-MCNC: 14.9 G/DL (ref 12–16)
IMM GRANULOCYTES # BLD AUTO: 0.02 K/UL (ref 0–0.11)
IMM GRANULOCYTES NFR BLD AUTO: 0.6 % (ref 0–0.9)
LYMPHOCYTES # BLD AUTO: 0.85 K/UL (ref 1–4.8)
LYMPHOCYTES NFR BLD: 23.9 % (ref 22–41)
MCH RBC QN AUTO: 31.3 PG (ref 27–33)
MCHC RBC AUTO-ENTMCNC: 32.5 G/DL (ref 33.6–35)
MCV RBC AUTO: 96.2 FL (ref 81.4–97.8)
MONOCYTES # BLD AUTO: 0.25 K/UL (ref 0–0.85)
MONOCYTES NFR BLD AUTO: 7 % (ref 0–13.4)
NEUTROPHILS # BLD AUTO: 2.23 K/UL (ref 2–7.15)
NEUTROPHILS NFR BLD: 62.9 % (ref 44–72)
NRBC # BLD AUTO: 0 K/UL
NRBC BLD-RTO: 0 /100 WBC
PLATELET # BLD AUTO: 217 K/UL (ref 164–446)
PMV BLD AUTO: 9.9 FL (ref 9–12.9)
RBC # BLD AUTO: 4.76 M/UL (ref 4.2–5.4)
T3 SERPL-MCNC: 102 NG/DL (ref 60–181)
T4 SERPL-MCNC: 7.3 UG/DL (ref 4–12)
TSH SERPL DL<=0.005 MIU/L-ACNC: 0.76 UIU/ML (ref 0.38–5.33)
WBC # BLD AUTO: 3.6 K/UL (ref 4.8–10.8)

## 2023-04-24 PROCEDURE — 80053 COMPREHEN METABOLIC PANEL: CPT

## 2023-04-24 PROCEDURE — 85025 COMPLETE CBC W/AUTO DIFF WBC: CPT

## 2023-04-24 PROCEDURE — 82785 ASSAY OF IGE: CPT

## 2023-04-24 PROCEDURE — 86364 TISS TRNSGLTMNASE EA IG CLAS: CPT

## 2023-04-24 PROCEDURE — 84480 ASSAY TRIIODOTHYRONINE (T3): CPT

## 2023-04-24 PROCEDURE — 82679 ASSAY OF ESTRONE: CPT

## 2023-04-24 PROCEDURE — 84443 ASSAY THYROID STIM HORMONE: CPT

## 2023-04-24 PROCEDURE — 83540 ASSAY OF IRON: CPT

## 2023-04-24 PROCEDURE — 83735 ASSAY OF MAGNESIUM: CPT

## 2023-04-24 PROCEDURE — 80061 LIPID PANEL: CPT

## 2023-04-24 PROCEDURE — 36415 COLL VENOUS BLD VENIPUNCTURE: CPT

## 2023-04-24 PROCEDURE — 83036 HEMOGLOBIN GLYCOSYLATED A1C: CPT

## 2023-04-24 PROCEDURE — 85652 RBC SED RATE AUTOMATED: CPT

## 2023-04-24 PROCEDURE — 82784 ASSAY IGA/IGD/IGG/IGM EACH: CPT

## 2023-04-24 PROCEDURE — 83001 ASSAY OF GONADOTROPIN (FSH): CPT

## 2023-04-24 PROCEDURE — 84436 ASSAY OF TOTAL THYROXINE: CPT

## 2023-04-24 PROCEDURE — 83550 IRON BINDING TEST: CPT

## 2023-04-24 PROCEDURE — 84550 ASSAY OF BLOOD/URIC ACID: CPT

## 2023-04-24 PROCEDURE — 83088 ASSAY OF HISTAMINE: CPT

## 2023-04-25 LAB
ALBUMIN SERPL BCP-MCNC: 4.4 G/DL (ref 3.2–4.9)
ALBUMIN/GLOB SERPL: 1.8 G/DL
ALP SERPL-CCNC: 59 U/L (ref 30–99)
ALT SERPL-CCNC: 22 U/L (ref 2–50)
ANION GAP SERPL CALC-SCNC: 12 MMOL/L (ref 7–16)
AST SERPL-CCNC: 21 U/L (ref 12–45)
BILIRUB SERPL-MCNC: 0.6 MG/DL (ref 0.1–1.5)
BUN SERPL-MCNC: 18 MG/DL (ref 8–22)
CALCIUM ALBUM COR SERPL-MCNC: 8.7 MG/DL (ref 8.5–10.5)
CALCIUM SERPL-MCNC: 9 MG/DL (ref 8.5–10.5)
CHLORIDE SERPL-SCNC: 105 MMOL/L (ref 96–112)
CHOLEST SERPL-MCNC: 230 MG/DL (ref 100–199)
CO2 SERPL-SCNC: 24 MMOL/L (ref 20–33)
CREAT SERPL-MCNC: 0.58 MG/DL (ref 0.5–1.4)
FSH SERPL-ACNC: 60 MIU/ML
GFR SERPLBLD CREATININE-BSD FMLA CKD-EPI: 102 ML/MIN/1.73 M 2
GLOBULIN SER CALC-MCNC: 2.4 G/DL (ref 1.9–3.5)
GLUCOSE SERPL-MCNC: 74 MG/DL (ref 65–99)
HDLC SERPL-MCNC: 82 MG/DL
IRON SATN MFR SERPL: 37 % (ref 15–55)
IRON SERPL-MCNC: 117 UG/DL (ref 40–170)
LDLC SERPL CALC-MCNC: 132 MG/DL
MAGNESIUM SERPL-MCNC: 2.2 MG/DL (ref 1.5–2.5)
POTASSIUM SERPL-SCNC: 4.2 MMOL/L (ref 3.6–5.5)
PROT SERPL-MCNC: 6.8 G/DL (ref 6–8.2)
SODIUM SERPL-SCNC: 141 MMOL/L (ref 135–145)
TIBC SERPL-MCNC: 315 UG/DL (ref 250–450)
TRIGL SERPL-MCNC: 80 MG/DL (ref 0–149)
UIBC SERPL-MCNC: 198 UG/DL (ref 110–370)
URATE SERPL-MCNC: 5.1 MG/DL (ref 1.9–8.2)

## 2023-04-27 LAB
IGA SERPL-MCNC: 127 MG/DL (ref 68–408)
IGE SERPL-ACNC: 12 KU/L
TTG IGA SER IA-ACNC: <2 U/ML (ref 0–3)

## 2023-04-28 LAB — ESTRONE SERPL-MCNC: 23.6 PG/ML

## 2023-04-29 LAB — HISTAMINE BLD-SCNC: 748 NMOL/L (ref 180–1800)

## 2023-07-11 ENCOUNTER — HOSPITAL ENCOUNTER (OUTPATIENT)
Facility: MEDICAL CENTER | Age: 63
End: 2023-07-11
Attending: NURSE PRACTITIONER
Payer: COMMERCIAL

## 2023-07-11 PROCEDURE — 88175 CYTOPATH C/V AUTO FLUID REDO: CPT

## 2023-07-11 PROCEDURE — 87077 CULTURE AEROBIC IDENTIFY: CPT

## 2023-07-11 PROCEDURE — 87086 URINE CULTURE/COLONY COUNT: CPT

## 2023-07-12 LAB — CYTOLOGY REG CYTOL: NORMAL

## 2023-07-14 LAB
BACTERIA UR CULT: ABNORMAL
BACTERIA UR CULT: ABNORMAL
SIGNIFICANT IND 70042: ABNORMAL
SITE SITE: ABNORMAL
SOURCE SOURCE: ABNORMAL

## 2024-10-10 ENCOUNTER — PATIENT MESSAGE (OUTPATIENT)
Dept: HEALTH INFORMATION MANAGEMENT | Facility: OTHER | Age: 64
End: 2024-10-10

## 2024-10-22 PROBLEM — M20.012 MALLET DEFORMITY OF LEFT MIDDLE FINGER: Status: ACTIVE | Noted: 2024-10-22
